# Patient Record
Sex: FEMALE | Race: WHITE | NOT HISPANIC OR LATINO | Employment: STUDENT | URBAN - METROPOLITAN AREA
[De-identification: names, ages, dates, MRNs, and addresses within clinical notes are randomized per-mention and may not be internally consistent; named-entity substitution may affect disease eponyms.]

---
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
## 2024-02-14 ENCOUNTER — TELEPHONE (OUTPATIENT)
Dept: PEDIATRICS | Facility: CLINIC | Age: 19
End: 2024-02-14
Payer: MEDICAID

## 2024-02-14 NOTE — TELEPHONE ENCOUNTER
----- Message from Alvino Orellana MD sent at 1/29/2024 11:49 AM EST -----  Regarding: schedule Well Adult Visit  Let PATIENT know that they are due for Well Adult Care.    Please schedule such as soon as possible.     If unable to reach by phone / portal, please send letter

## 2024-04-04 ENCOUNTER — OFFICE VISIT (OUTPATIENT)
Dept: ORTHOPEDIC SURGERY | Facility: CLINIC | Age: 19
End: 2024-04-04

## 2024-04-04 ENCOUNTER — HOSPITAL ENCOUNTER (OUTPATIENT)
Dept: RADIOLOGY | Facility: CLINIC | Age: 19
Discharge: HOME | End: 2024-04-04

## 2024-04-04 DIAGNOSIS — M79.641 RIGHT HAND PAIN: ICD-10-CM

## 2024-04-04 DIAGNOSIS — S63.650A SPRAIN OF METACARPOPHALANGEAL (MCP) JOINT OF RIGHT INDEX FINGER, INITIAL ENCOUNTER: Primary | ICD-10-CM

## 2024-04-04 PROCEDURE — 99203 OFFICE O/P NEW LOW 30 MIN: CPT | Performed by: FAMILY MEDICINE

## 2024-04-04 PROCEDURE — 73130 X-RAY EXAM OF HAND: CPT | Mod: RIGHT SIDE | Performed by: FAMILY MEDICINE

## 2024-04-04 PROCEDURE — 73130 X-RAY EXAM OF HAND: CPT | Mod: RT

## 2024-04-04 PROCEDURE — 99214 OFFICE O/P EST MOD 30 MIN: CPT | Performed by: FAMILY MEDICINE

## 2024-04-04 PROCEDURE — 1036F TOBACCO NON-USER: CPT | Performed by: FAMILY MEDICINE

## 2024-04-04 PROCEDURE — L3809 WHFO W/O JOINTS PRE OTS: HCPCS | Performed by: FAMILY MEDICINE

## 2024-04-05 NOTE — PROGRESS NOTES
Acute Injury New Patient Visit    CC:   Chief Complaint   Patient presents with    Right Hand - Pain     Patient states had previous injury oct 2023 but no insurance so ayana taped fingers herself. New injury 4/3/24- smacked hand on table. Pain at nuckle on index finger. [facility]       HPI: Bharti is a 19 y.o.female who presents today with new complaints of right index finger MCP pain and discomfort.  She points to that area of her right hand as area of discomfort as she is smacked it on a table just yesterday.  She denies any numbness tingling or burning.  She has limited range of motion and wanted to make sure that there is no significant fracture or injury.  She had a previous injury as well and had just ayana taped her fingers.  She thought given the recurrence of injury she should get it checked and she has insurance now.  She denies any additional issue or concern at present.  She is some stiffness and swelling denies any open cuts or sores.        Review of Systems   GENERAL: Negative for malaise, significant weight loss, fever  MUSCULOSKELETAL: See HPI  NEURO: Negative for numbness / tingling     Past Medical History  Past Medical History:   Diagnosis Date    Other conditions influencing health status 2019    No prior hospitalizations    Other conditions influencing health status 2019    Birth of        Medication review  Medication Documentation Review Audit       Reviewed by Cole C Budinsky, MD (Physician) on 24 at 1209      Medication Order Taking? Sig Documenting Provider Last Dose Status   mometasone (Elocon) 0.1 % ointment [id number]  APPLY SPARINGLY TO AFFECTED AREA(S) TWICE DAILY x 5 DAYS, THEN HOLD FOR TWO DAYS Historical Provider, MD  Active   tretinoin (Retin-A) 0.025 % gel [id number]  Apply topically. Apply sparingly to affected area once daily at bedtime Historical Provider, MD  Active                    Allergies  No Known Allergies    Social  "History  Social History     Socioeconomic History    Marital status: Single     Spouse name: Not on file    Number of children: Not on file    Years of education: Not on file    Highest education level: Not on file   Occupational History    Not on file   Tobacco Use    Smoking status: Never     Passive exposure: Never    Smokeless tobacco: Never   Vaping Use    Vaping Use: Never used   Substance and Sexual Activity    Alcohol use: Not on file    Drug use: Not on file    Sexual activity: Not on file   Other Topics Concern    Not on file   Social History Narrative    Lives with  Mom (Gisselle), maternal grandmother (Brenda), brother (Rafa 2/9/05), and half sister (Ailyn 12/27/07). Mom works at [employer], maternal grandmother  is a  at neurology center.        Mother (Gisselle)  Attention deficit disorder    Father  Bipolar affective disorder, Attention deficit hyperactivity disorder        Brother (Rafa 2/9/05)  healthy    Half sister (Ailyn 12/27/07)  asthma    Half brother (Patrice)  \"Bowel problem\"        Maternal Grandmother  healthy    Maternal Grandfather  healthy    Paternal Grandmother  unknown    Paternal Grandfather  unknown     Social Determinants of Health     Financial Resource Strain: Not on file   Food Insecurity: Not on file   Transportation Needs: Not on file   Physical Activity: Not on file   Stress: Not on file   Social Connections: Not on file   Intimate Partner Violence: Not on file   Housing Stability: Not on file       Surgical History  Past Surgical History:   Procedure Laterality Date    OTHER SURGICAL HISTORY  12/04/2019    No history of surgery       Physical Exam:  GENERAL:  Patient is awake, alert, and oriented to person place and time.  Patient appears well nourished and well kept.  Affect Calm, Not Acutely Distressed.  HEENT:  Normocephalic, Atraumatic, EOMI  CARDIOVASCULAR:  Hemodynamically stable.  RESPIRATORY:  Normal respirations with unlabored breathing.  NEURO: " Gross sensation intact to the upper extremities bilaterally.  Extremity: Right hand exam demonstrate soft tissue swelling without any obvious bruising.  She has tenderness palpation at the MCP joint the IP joints are otherwise unaffected.  She is able to fully extend she can AB duct and adductor the digits well without issue.  She has a gentle grasp and  there is no pain about the metacarpal proximally there is no pain at the snuffbox distal radius or distal ulna.  She can give a thumbs up and okay sign without much issue.  Forearm compartment soft compressible.      Diagnostics: X-rays today negative as below  XR hand right 3+ views          Interpreted By:  Budinsky, Cole,   STUDY:  XR HAND RIGHT 3+ VIEWS; ;  4/4/2024 3:47 pm      INDICATION:  Signs/Symptoms:pain.      ACCESSION NUMBER(S):  [id number]      ORDERING CLINICIAN:  COLE BUDINSKY      IMPRESSION:  Multiple views of the right hand demonstrate mild soft tissue  swelling about the 2nd MCP joint. No presence for fracture or  dislocation otherwise.          Signed by: Cole Budinsky 4/4/2024 5:05 PM  Dictation workstation:   [id number]             Procedure: None  Procedures    Assessment:   Problem List Items Addressed This Visit    None  Visit Diagnoses       Sprain of metacarpophalangeal (MCP) joint of right index finger, initial encounter    -  Primary    Relevant Orders    Exos Hand Based Radial Gutter    Right hand pain        Relevant Orders    XR hand right 3+ views (Completed)             Plan: At this time we will offer the patient a hand-based radial gutter removable fracture brace.  She may come out of this for early range of motion and strength recovery.  This would be provided so she does not keep hitting it and popping it.  Recommended she work on the range of motion and strength recovery as discussed.  She can use ice Tylenol anti-inflammatories over-the-counter for pain control.  In my absence in the near future we will have her  follow-up with Dr. Renée Mendoza going forward to rule out any nondisplaced fracture or occult fracture not seen here today and for any further recommendations at that time.  Orders Placed This Encounter    Exos Hand Based Radial Gutter    XR hand right 3+ views      At the conclusion of the visit there were no further questions by the patient/family regarding their plan of care.  Patient was instructed to call or return with any issues, questions, or concerns regarding their injury and/or treatment plan described above.     04/05/24 at 12:10 PM - Cole C Budinsky, MD    Office: [phone]    This note was prepared using voice recognition software.  The details of this note are correct and have been reviewed, and corrected to the best of my ability.  Some grammatical errors may persist related to the Dragon software.

## 2024-04-19 ENCOUNTER — APPOINTMENT (OUTPATIENT)
Dept: ORTHOPEDIC SURGERY | Facility: CLINIC | Age: 19
End: 2024-04-19

## 2024-04-26 ENCOUNTER — TELEPHONE (OUTPATIENT)
Dept: PEDIATRICS | Facility: CLINIC | Age: 19
End: 2024-04-26

## 2024-04-26 NOTE — TELEPHONE ENCOUNTER
----- Message from Alvino Orellana MD sent at 4/23/2024 10:21 AM EDT -----  Regarding: schedule Well Adult Visit  Let PATIENT know that they are due for Well Adult Care.    Please schedule such as soon as possible.     If unable to reach by phone / portal, please send letter

## 2024-07-05 ENCOUNTER — TELEPHONE (OUTPATIENT)
Dept: PEDIATRICS | Facility: CLINIC | Age: 19
End: 2024-07-05

## 2024-07-05 NOTE — TELEPHONE ENCOUNTER
----- Message from Alvino Orellana MD sent at 7/5/2024  2:40 PM EDT -----  Regarding: schedule Well Adult Visit  Let PATIENT know that they are due for Well Adult Care.    Please schedule such as soon as possible.     If unable to reach by phone / portal, please send letter

## 2024-09-05 ENCOUNTER — TELEPHONE (OUTPATIENT)
Dept: PEDIATRICS | Facility: CLINIC | Age: 19
End: 2024-09-05

## 2024-09-05 NOTE — TELEPHONE ENCOUNTER
----- Message from Alvino Orellana sent at 9/5/2024  2:55 PM EDT -----  Regarding: schedule Well Adult Visit  Let PATIENT know that they are due for Well Adult Care.    Please schedule such as soon as possible.     If unable to reach by phone / portal, please send letter

## 2024-11-01 ENCOUNTER — TELEPHONE (OUTPATIENT)
Dept: PEDIATRICS | Facility: CLINIC | Age: 19
End: 2024-11-01

## 2024-12-03 ENCOUNTER — TELEPHONE (OUTPATIENT)
Dept: PEDIATRICS | Facility: CLINIC | Age: 19
End: 2024-12-03

## 2024-12-03 NOTE — TELEPHONE ENCOUNTER
----- Message from Alvino Orellana sent at 11/11/2024  4:26 PM EST -----  Regarding: schedule Well Adult Visit  Let PATIENT know that they are due for Well Adult Care.    Based on our records, patient is quite  behind on their medical care and/or on immunizations.  If patient is followed by another provider, let us know and we will update our records.    Otherwise, please schedule a Well Visit as soon as possible.     If unable to reach by phone / portal, please send letter

## 2025-01-02 ENCOUNTER — TELEPHONE (OUTPATIENT)
Dept: PEDIATRICS | Facility: CLINIC | Age: 20
End: 2025-01-02

## 2025-01-02 NOTE — TELEPHONE ENCOUNTER
----- Message from Alvino Orellana sent at 12/16/2024 12:54 PM EST -----  Regarding: schedule Well Adult Visit  Let PATIENT know that they are due for Well Adult Care.    Based on our records, patient is quite  behind on their medical care and/or on immunizations.  If patient is followed by another provider, let us know and we will update our records.    Otherwise, please schedule a Well Visit as soon as possible.     If unable to reach by phone / portal, please send letter

## 2025-01-30 ENCOUNTER — TELEPHONE (OUTPATIENT)
Dept: PEDIATRICS | Facility: CLINIC | Age: 20
End: 2025-01-30

## 2025-01-30 NOTE — TELEPHONE ENCOUNTER
----- Message from Alvino Orellana sent at 1/21/2025 12:11 PM EST -----  Regarding: schedule Well Adult Visit  Let PATIENT know that they are due for Well Adult Care.    Please schedule such as soon as possible.     If unable to reach by phone / portal, please send letter

## 2025-02-26 ENCOUNTER — TELEPHONE (OUTPATIENT)
Dept: PEDIATRICS | Facility: CLINIC | Age: 20
End: 2025-02-26

## 2025-02-26 NOTE — TELEPHONE ENCOUNTER
----- Message from Alvino Orellana sent at 2/17/2025 10:11 AM EST -----  Regarding: schedule Well Adult Visit  Let PATIENT know that they are due for Well Adult Care.    Based on our records, patient is quite  behind on their medical care and/or on immunizations.  If patient is followed by another provider, let us know and we will update our records.    Otherwise, please schedule a Well Visit as soon as possible.     If unable to reach by phone / portal, please send letter

## 2025-03-24 ENCOUNTER — TELEPHONE (OUTPATIENT)
Dept: PEDIATRICS | Facility: CLINIC | Age: 20
End: 2025-03-24

## 2025-03-24 NOTE — TELEPHONE ENCOUNTER
----- Message from Alvino Orellana sent at 3/17/2025 10:17 AM EDT -----  Regarding: schedule Well Adult Visit  Let PATIENT know that they are due for Well Adult Care.    Based on our records, patient is quite  behind on their medical care and/or on immunizations.  If patient is followed by another provider, let us know and we will update our records.    Otherwise, please schedule a Well Visit as soon as possible.     If unable to reach by phone / portal, please send letter   79 y/o M with finger lac.  Will update tdap, lac repair.